# Patient Record
Sex: FEMALE | Race: WHITE | NOT HISPANIC OR LATINO | Employment: UNEMPLOYED | URBAN - METROPOLITAN AREA
[De-identification: names, ages, dates, MRNs, and addresses within clinical notes are randomized per-mention and may not be internally consistent; named-entity substitution may affect disease eponyms.]

---

## 2024-03-22 ENCOUNTER — ANESTHESIA (OUTPATIENT)
Dept: LABOR AND DELIVERY | Facility: HOSPITAL | Age: 24
End: 2024-03-22
Payer: COMMERCIAL

## 2024-03-22 ENCOUNTER — HOSPITAL ENCOUNTER (INPATIENT)
Facility: HOSPITAL | Age: 24
LOS: 3 days | Discharge: HOME | End: 2024-03-25
Attending: OBSTETRICS & GYNECOLOGY | Admitting: OBSTETRICS & GYNECOLOGY
Payer: COMMERCIAL

## 2024-03-22 ENCOUNTER — ANESTHESIA EVENT (OUTPATIENT)
Dept: LABOR AND DELIVERY | Facility: HOSPITAL | Age: 24
End: 2024-03-22
Payer: COMMERCIAL

## 2024-03-22 ENCOUNTER — ROUTINE PRENATAL (OUTPATIENT)
Dept: OBSTETRICS AND GYNECOLOGY | Facility: CLINIC | Age: 24
End: 2024-03-22
Payer: COMMERCIAL

## 2024-03-22 VITALS — WEIGHT: 164 LBS | SYSTOLIC BLOOD PRESSURE: 150 MMHG | DIASTOLIC BLOOD PRESSURE: 100 MMHG | BODY MASS INDEX: 33.12 KG/M2

## 2024-03-22 DIAGNOSIS — O99.323 SUBOXONE MAINTENANCE TREATMENT COMPLICATING PREGNANCY, ANTEPARTUM, THIRD TRIMESTER: ICD-10-CM

## 2024-03-22 DIAGNOSIS — Z34.03 PRIMIGRAVIDA, THIRD TRIMESTER: Primary | ICD-10-CM

## 2024-03-22 DIAGNOSIS — Z3A.38 38 WEEKS GESTATION OF PREGNANCY: ICD-10-CM

## 2024-03-22 DIAGNOSIS — F11.11 HISTORY OF OPIOID ABUSE: ICD-10-CM

## 2024-03-22 DIAGNOSIS — F11.20 SUBOXONE MAINTENANCE TREATMENT COMPLICATING PREGNANCY, ANTEPARTUM, THIRD TRIMESTER: ICD-10-CM

## 2024-03-22 PROBLEM — Z34.90 PREGNANCY: Status: ACTIVE | Noted: 2024-03-22

## 2024-03-22 PROBLEM — O13.3 GESTATIONAL HYPERTENSION, THIRD TRIMESTER: Status: ACTIVE | Noted: 2024-03-22

## 2024-03-22 LAB
ABO GROUP BLD: NORMAL
ALBUMIN SERPL-MCNC: 3.1 G/DL (ref 3.5–5.2)
ALBUMIN/GLOB SERPL: 0.9 G/DL
ALP SERPL-CCNC: 156 U/L (ref 39–117)
ALT SERPL W P-5'-P-CCNC: 11 U/L (ref 1–33)
ANION GAP SERPL CALCULATED.3IONS-SCNC: 12.2 MMOL/L (ref 5–15)
AST SERPL-CCNC: 17 U/L (ref 1–32)
BASOPHILS # BLD AUTO: 0.06 10*3/MM3 (ref 0–0.2)
BASOPHILS NFR BLD AUTO: 0.5 % (ref 0–1.5)
BILIRUB SERPL-MCNC: 0.8 MG/DL (ref 0–1.2)
BLD GP AB SCN SERPL QL: NEGATIVE
BUN SERPL-MCNC: 9 MG/DL (ref 6–20)
BUN/CREAT SERPL: 13 (ref 7–25)
CALCIUM SPEC-SCNC: 9 MG/DL (ref 8.6–10.5)
CHLORIDE SERPL-SCNC: 106 MMOL/L (ref 98–107)
CO2 SERPL-SCNC: 19.8 MMOL/L (ref 22–29)
CREAT SERPL-MCNC: 0.69 MG/DL (ref 0.57–1)
DEPRECATED RDW RBC AUTO: 43.6 FL (ref 37–54)
EGFRCR SERPLBLD CKD-EPI 2021: 123.7 ML/MIN/1.73
EOSINOPHIL # BLD AUTO: 0.6 10*3/MM3 (ref 0–0.4)
EOSINOPHIL NFR BLD AUTO: 4.8 % (ref 0.3–6.2)
ERYTHROCYTE [DISTWIDTH] IN BLOOD BY AUTOMATED COUNT: 12.7 % (ref 12.3–15.4)
EXPIRATION DATE: NORMAL
GLOBULIN UR ELPH-MCNC: 3.3 GM/DL
GLUCOSE SERPL-MCNC: 122 MG/DL (ref 65–99)
GLUCOSE UR STRIP-MCNC: NEGATIVE MG/DL
HCT VFR BLD AUTO: 34.2 % (ref 34–46.6)
HGB BLD-MCNC: 11.7 G/DL (ref 12–15.9)
IMM GRANULOCYTES # BLD AUTO: 0.1 10*3/MM3 (ref 0–0.05)
IMM GRANULOCYTES NFR BLD AUTO: 0.8 % (ref 0–0.5)
LYMPHOCYTES # BLD AUTO: 1.98 10*3/MM3 (ref 0.7–3.1)
LYMPHOCYTES NFR BLD AUTO: 15.8 % (ref 19.6–45.3)
Lab: NORMAL
MCH RBC QN AUTO: 32.4 PG (ref 26.6–33)
MCHC RBC AUTO-ENTMCNC: 34.2 G/DL (ref 31.5–35.7)
MCV RBC AUTO: 94.7 FL (ref 79–97)
MONOCYTES # BLD AUTO: 0.85 10*3/MM3 (ref 0.1–0.9)
MONOCYTES NFR BLD AUTO: 6.8 % (ref 5–12)
NEUTROPHILS NFR BLD AUTO: 71.3 % (ref 42.7–76)
NEUTROPHILS NFR BLD AUTO: 8.96 10*3/MM3 (ref 1.7–7)
NRBC BLD AUTO-RTO: 0 /100 WBC (ref 0–0.2)
PLATELET # BLD AUTO: 183 10*3/MM3 (ref 140–450)
PMV BLD AUTO: 12.3 FL (ref 6–12)
POTASSIUM SERPL-SCNC: 3.6 MMOL/L (ref 3.5–5.2)
PROT SERPL-MCNC: 6.4 G/DL (ref 6–8.5)
PROT UR STRIP-MCNC: NEGATIVE MG/DL
RBC # BLD AUTO: 3.61 10*6/MM3 (ref 3.77–5.28)
RH BLD: POSITIVE
SODIUM SERPL-SCNC: 138 MMOL/L (ref 136–145)
T PALLIDUM IGG SER QL: NORMAL
T&S EXPIRATION DATE: NORMAL
WBC NRBC COR # BLD AUTO: 12.55 10*3/MM3 (ref 3.4–10.8)

## 2024-03-22 PROCEDURE — 86850 RBC ANTIBODY SCREEN: CPT | Performed by: OBSTETRICS & GYNECOLOGY

## 2024-03-22 PROCEDURE — 80307 DRUG TEST PRSMV CHEM ANLYZR: CPT | Performed by: OBSTETRICS & GYNECOLOGY

## 2024-03-22 PROCEDURE — 86900 BLOOD TYPING SEROLOGIC ABO: CPT | Performed by: OBSTETRICS & GYNECOLOGY

## 2024-03-22 PROCEDURE — 80053 COMPREHEN METABOLIC PANEL: CPT | Performed by: OBSTETRICS & GYNECOLOGY

## 2024-03-22 PROCEDURE — 84156 ASSAY OF PROTEIN URINE: CPT | Performed by: OBSTETRICS & GYNECOLOGY

## 2024-03-22 PROCEDURE — 25010000002 MORPHINE PER 10 MG: Performed by: OBSTETRICS & GYNECOLOGY

## 2024-03-22 PROCEDURE — 86901 BLOOD TYPING SEROLOGIC RH(D): CPT | Performed by: OBSTETRICS & GYNECOLOGY

## 2024-03-22 PROCEDURE — 3E0P7VZ INTRODUCTION OF HORMONE INTO FEMALE REPRODUCTIVE, VIA NATURAL OR ARTIFICIAL OPENING: ICD-10-PCS | Performed by: OBSTETRICS & GYNECOLOGY

## 2024-03-22 PROCEDURE — 82570 ASSAY OF URINE CREATININE: CPT | Performed by: OBSTETRICS & GYNECOLOGY

## 2024-03-22 PROCEDURE — 85025 COMPLETE CBC W/AUTO DIFF WBC: CPT | Performed by: OBSTETRICS & GYNECOLOGY

## 2024-03-22 PROCEDURE — 25810000003 LACTATED RINGERS PER 1000 ML: Performed by: OBSTETRICS & GYNECOLOGY

## 2024-03-22 PROCEDURE — 86780 TREPONEMA PALLIDUM: CPT | Performed by: OBSTETRICS & GYNECOLOGY

## 2024-03-22 PROCEDURE — 3E033VJ INTRODUCTION OF OTHER HORMONE INTO PERIPHERAL VEIN, PERCUTANEOUS APPROACH: ICD-10-PCS | Performed by: OBSTETRICS & GYNECOLOGY

## 2024-03-22 RX ORDER — DIPHENHYDRAMINE HYDROCHLORIDE 50 MG/ML
12.5 INJECTION INTRAMUSCULAR; INTRAVENOUS EVERY 8 HOURS PRN
Status: DISCONTINUED | OUTPATIENT
Start: 2024-03-22 | End: 2024-03-23 | Stop reason: HOSPADM

## 2024-03-22 RX ORDER — PRENATAL VIT NO.126/IRON/FOLIC 28MG-0.8MG
TABLET ORAL DAILY
COMMUNITY

## 2024-03-22 RX ORDER — EPHEDRINE SULFATE 50 MG/ML
5 INJECTION, SOLUTION INTRAVENOUS
Status: DISCONTINUED | OUTPATIENT
Start: 2024-03-22 | End: 2024-03-23 | Stop reason: HOSPADM

## 2024-03-22 RX ORDER — TERBUTALINE SULFATE 1 MG/ML
0.25 INJECTION, SOLUTION SUBCUTANEOUS AS NEEDED
Status: DISCONTINUED | OUTPATIENT
Start: 2024-03-22 | End: 2024-03-23 | Stop reason: HOSPADM

## 2024-03-22 RX ORDER — ONDANSETRON 4 MG/1
4 TABLET, ORALLY DISINTEGRATING ORAL EVERY 6 HOURS PRN
Status: DISCONTINUED | OUTPATIENT
Start: 2024-03-22 | End: 2024-03-23 | Stop reason: HOSPADM

## 2024-03-22 RX ORDER — DIPHENHYDRAMINE HCL 25 MG
25 CAPSULE ORAL EVERY 6 HOURS PRN
COMMUNITY
End: 2024-03-25 | Stop reason: HOSPADM

## 2024-03-22 RX ORDER — SODIUM CHLORIDE, SODIUM LACTATE, POTASSIUM CHLORIDE, CALCIUM CHLORIDE 600; 310; 30; 20 MG/100ML; MG/100ML; MG/100ML; MG/100ML
125 INJECTION, SOLUTION INTRAVENOUS CONTINUOUS
Status: DISCONTINUED | OUTPATIENT
Start: 2024-03-22 | End: 2024-03-23

## 2024-03-22 RX ORDER — MORPHINE SULFATE 2 MG/ML
1 INJECTION, SOLUTION INTRAMUSCULAR; INTRAVENOUS
Status: DISCONTINUED | OUTPATIENT
Start: 2024-03-22 | End: 2024-03-23 | Stop reason: HOSPADM

## 2024-03-22 RX ORDER — LIDOCAINE HYDROCHLORIDE 10 MG/ML
0.5 INJECTION, SOLUTION INFILTRATION; PERINEURAL ONCE AS NEEDED
Status: DISCONTINUED | OUTPATIENT
Start: 2024-03-22 | End: 2024-03-22

## 2024-03-22 RX ORDER — MAGNESIUM CARB/ALUMINUM HYDROX 105-160MG
30 TABLET,CHEWABLE ORAL ONCE AS NEEDED
Status: DISCONTINUED | OUTPATIENT
Start: 2024-03-22 | End: 2024-03-23 | Stop reason: HOSPADM

## 2024-03-22 RX ORDER — FAMOTIDINE 20 MG/1
20 TABLET, FILM COATED ORAL 2 TIMES DAILY PRN
Status: DISCONTINUED | OUTPATIENT
Start: 2024-03-22 | End: 2024-03-23 | Stop reason: HOSPADM

## 2024-03-22 RX ORDER — ONDANSETRON 2 MG/ML
4 INJECTION INTRAMUSCULAR; INTRAVENOUS ONCE AS NEEDED
Status: COMPLETED | OUTPATIENT
Start: 2024-03-22 | End: 2024-03-23

## 2024-03-22 RX ORDER — FENTANYL/ROPIVACAINE/NS/PF 2MCG/ML-.2
10 PLASTIC BAG, INJECTION (ML) INJECTION CONTINUOUS
Status: DISCONTINUED | OUTPATIENT
Start: 2024-03-22 | End: 2024-03-23

## 2024-03-22 RX ORDER — SODIUM CHLORIDE 0.9 % (FLUSH) 0.9 %
10 SYRINGE (ML) INJECTION AS NEEDED
Status: DISCONTINUED | OUTPATIENT
Start: 2024-03-22 | End: 2024-03-22

## 2024-03-22 RX ORDER — FAMOTIDINE 10 MG/ML
20 INJECTION, SOLUTION INTRAVENOUS 2 TIMES DAILY PRN
Status: DISCONTINUED | OUTPATIENT
Start: 2024-03-22 | End: 2024-03-23 | Stop reason: HOSPADM

## 2024-03-22 RX ORDER — FAMOTIDINE 10 MG/ML
20 INJECTION, SOLUTION INTRAVENOUS ONCE AS NEEDED
Status: DISCONTINUED | OUTPATIENT
Start: 2024-03-22 | End: 2024-03-23 | Stop reason: HOSPADM

## 2024-03-22 RX ORDER — ACETAMINOPHEN 325 MG/1
650 TABLET ORAL EVERY 4 HOURS PRN
Status: DISCONTINUED | OUTPATIENT
Start: 2024-03-22 | End: 2024-03-23 | Stop reason: HOSPADM

## 2024-03-22 RX ORDER — MISOPROSTOL 100 MCG
25 TABLET ORAL EVERY 4 HOURS
Status: DISCONTINUED | OUTPATIENT
Start: 2024-03-22 | End: 2024-03-23

## 2024-03-22 RX ORDER — ONDANSETRON 2 MG/ML
4 INJECTION INTRAMUSCULAR; INTRAVENOUS EVERY 6 HOURS PRN
Status: DISCONTINUED | OUTPATIENT
Start: 2024-03-22 | End: 2024-03-23 | Stop reason: HOSPADM

## 2024-03-22 RX ORDER — SODIUM CHLORIDE 9 MG/ML
40 INJECTION, SOLUTION INTRAVENOUS AS NEEDED
Status: DISCONTINUED | OUTPATIENT
Start: 2024-03-22 | End: 2024-03-22

## 2024-03-22 RX ORDER — SODIUM CHLORIDE 0.9 % (FLUSH) 0.9 %
10 SYRINGE (ML) INJECTION EVERY 12 HOURS SCHEDULED
Status: DISCONTINUED | OUTPATIENT
Start: 2024-03-22 | End: 2024-03-22

## 2024-03-22 RX ADMIN — Medication 25 MCG: at 21:40

## 2024-03-22 RX ADMIN — SODIUM CHLORIDE, POTASSIUM CHLORIDE, SODIUM LACTATE AND CALCIUM CHLORIDE 125 ML/HR: 600; 310; 30; 20 INJECTION, SOLUTION INTRAVENOUS at 16:38

## 2024-03-22 RX ADMIN — MORPHINE SULFATE 1 MG: 2 INJECTION, SOLUTION INTRAMUSCULAR; INTRAVENOUS at 22:06

## 2024-03-22 RX ADMIN — Medication 25 MCG: at 17:36

## 2024-03-22 NOTE — H&P
[facility]  Obstetric History and Physical    Chief Complaint   Patient presents with    Scheduled Induction     Elevated B/P           Patient is a 23 y.o. female  currently at 38w6d, who presents from office for induction due to HDP at term.    Her prenatal care was with Dr. Jin and was complicated by 1) Suboxone treatment in pregnancy, 2) Hypertensive disorder of pregnancy, suspected gestational hypertension .        External Prenatal Results       Pregnancy Outside Results - Transcribed From Office Records - See Scanned Records For Details       Test Value Date Time    ABO  A  10/09/23 1231    Rh  Positive  10/09/23 1231    Antibody Screen  Negative  10/09/23 1231    Varicella IgG  <135 index 10/09/23 1231    Rubella  1.10 index 10/09/23 1231    Hgb  12.9 g/dL 10/09/23 1231    Hct  39.3 % 10/09/23 1231    Glucose Fasting GTT       Glucose Tolerance Test 1 hour       Glucose Tolerance Test 3 hour       Gonorrhea (discrete)  Negative  10/09/23 1357    Chlamydia (discrete)  Negative  10/09/23 1357    RPR  Non Reactive  10/09/23 1231    VDRL       Syphilis Antibody       HBsAg  Negative  10/09/23 1231    Herpes Simplex Virus PCR       Herpes Simplex VIrus Culture       HIV  Non Reactive  10/09/23 1231    Hep C RNA Quant PCR       Hep C Antibody  Non Reactive  10/09/23 1231    AFP       Group B Strep  Negative  24 1526    GBS Susceptibility to Clindamycin       GBS Susceptibility to Erythromycin       Fetal Fibronectin       Genetic Testing, Maternal Blood                 Drug Screening       Test Value Date Time    Urine Drug Screen       Amphetamine Screen  Negative ng/mL 01/10/24 1424       Negative ng/mL 10/09/23 1340    Barbiturate Screen  Negative ng/mL 01/10/24 1424       Negative ng/mL 10/09/23 1340    Benzodiazepine Screen  Negative ng/mL 01/10/24 1424       Negative  10/09/23 1340    Methadone Screen  Negative ng/mL 01/10/24 1424       Negative ng/mL 10/09/23 1340    Phencyclidine Screen   Negative ng/mL 01/10/24 1424       Negative ng/mL 10/09/23 1340    Opiates Screen  Negative  10/27/21 1352    THC Screen  Positive  10/27/21 1352    Cocaine Screen       Propoxyphene Screen  Negative ng/mL 01/10/24 1424       Negative ng/mL 10/09/23 1340    Buprenorphine Screen       Methamphetamine Screen       Oxycodone Screen  Negative  10/27/21 1352    Tricyclic Antidepressants Screen                 Legend    ^: Historical                               OB History    Para Term  AB Living   1 0 0 0 0 0   SAB IAB Ectopic Molar Multiple Live Births   0 0 0 0 0 0      # Outcome Date GA Lbr Dmitri/2nd Weight Sex Type Anes PTL Lv   1 Current                    Past Medical History:   Diagnosis Date    Anxiety     Asthma     Depression     Substance abuse     Pain Meds        History reviewed. No pertinent surgical history.       No current facility-administered medications on file prior to encounter.     Current Outpatient Medications on File Prior to Encounter   Medication Sig Dispense Refill    acetaminophen (Tylenol) 325 MG tablet Tylenol 325 mg oral tablet take 2 tablets (650 mg) by oral route every 4 hours as needed   Suspended      albuterol (PROVENTIL) (2.5 MG/3ML) 0.083% nebulizer solution albuterol sulfate 2.5 mg /3 mL (0.083 %) inhalation solution for nebulization inhale 3 milliliters (2.5 mg) by nebulization route every 4 hours   Suspended      albuterol sulfate  (90 Base) MCG/ACT inhaler albuterol sulfate 90 mcg/actuation inhalation HFA aerosol inhaler inhale 2 puffs (180 mcg) by inhalation route every 4-6 hours as needed for 30 days 3/15/2021  Active      buprenorphine-naloxone (SUBOXONE) 8-2 MG per SL tablet PLACE 2 TABLETS UNDER THE TONGUE EVERY DAY      diphenhydrAMINE (BENADRYL) 25 mg capsule Take 1 capsule by mouth Every 6 (Six) Hours As Needed for Itching.      prenatal vitamin (prenatal, CLASSIC, vitamin) tablet Take  by mouth Daily.      ipratropium-albuterol (DUO-NEB) 0.5-2.5  "mg/3 ml nebulizer ipratropium-albuterol 0.5 mg-3 mg(2.5 mg base)/3 mL inhalation solution for nebulization inhale 3 milliliters by nebulization route 1 time per day for 30 days 5/24/2021  Active      naloxone (NARCAN) 4 MG/0.1ML nasal spray CALL 911. SPR CONTENTS OF ONE SPRAYER (0.1ML) INTO ONE NOSTRIL. REPEAT IN 2-3 MIN IF SYMPTOMS OF OPIOID EMERGENCY PERSIST, ALTERNATE NOSTRILS      ondansetron ODT (ZOFRAN-ODT) 4 MG disintegrating tablet DISSOLVE 1 TABLET ON THE TONGUE EVERY 4 HOURS FOR 3 DAYS AS NEEDED FOR NAUSEA OR VOMITING (Patient not taking: Reported on 1/10/2024)            Allergies   Allergen Reactions    Penicillins Other (See Comments)     AS A KID          Social History     Socioeconomic History    Marital status: Single   Tobacco Use    Smoking status: Former     Types: Cigarettes     Passive exposure: Past    Smokeless tobacco: Never   Vaping Use    Vaping status: Never Used   Substance and Sexual Activity    Alcohol use: Not Currently    Drug use: Not Currently     Types: Marijuana, Hydrocodone, Oxycodone    Sexual activity: Yes     Birth control/protection: None          Family History   Problem Relation Age of Onset    Heart failure Mother     Seizures Mother     No Known Problems Father     No Known Problems Sister     No Known Problems Brother     No Known Problems Brother     No Known Problems Brother     Breast cancer Maternal Aunt         Review of Systems   Constitutional:  Negative for fever.   Eyes:  Negative for visual disturbance.   Gastrointestinal:  Negative for abdominal pain.   Genitourinary:  Negative for dysuria, pelvic pain, vaginal bleeding and vaginal discharge.   Neurological:  Negative for headache.   All other systems reviewed and are negative.      /98   Pulse 109   Temp 97.6 °F (36.4 °C) (Oral)   Resp 16   Ht 152.4 cm (60\")   Wt 74.4 kg (164 lb)   LMP 07/25/2023 (Approximate)   SpO2 100%   BMI 32.03 kg/m²     Physical Exam  Vitals reviewed. Exam conducted with " a chaperone present.   Constitutional:       General: She is not in acute distress.     Appearance: She is well-developed and normal weight.   HENT:      Head: Normocephalic and atraumatic.   Eyes:      General:         Right eye: No discharge.         Left eye: No discharge.      Conjunctiva/sclera: Conjunctivae normal.   Neck:      Thyroid: No thyromegaly.   Cardiovascular:      Rate and Rhythm: Normal rate and regular rhythm.      Heart sounds: Normal heart sounds. No murmur heard.  Pulmonary:      Effort: Pulmonary effort is normal. No respiratory distress.      Breath sounds: Normal breath sounds.   Abdominal:      General: There is distension (EFW 6.5#).      Palpations: Abdomen is soft.      Tenderness: There is no abdominal tenderness.   Genitourinary:     Labia:         Right: No lesion.         Left: No lesion.       Cervix: No cervical bleeding. Lesions: 1-2/60/-2.     Uterus: Enlarged (Gravid).    Musculoskeletal:         General: No tenderness. Normal range of motion.      Cervical back: Normal range of motion and neck supple.      Right lower leg: Edema (1+ edema) present.      Left lower leg: Edema present.   Lymphadenopathy:      Cervical: No cervical adenopathy.   Skin:     General: Skin is warm and dry.      Findings: No rash.   Neurological:      General: No focal deficit present.      Mental Status: She is alert and oriented to person, place, and time.      Deep Tendon Reflexes: Reflexes abnormal (3+ dtrs, no clonus).   Psychiatric:         Behavior: Behavior normal.         Thought Content: Thought content normal.         Judgment: Judgment normal.           FHT - Reassuring, category I   [place]allow[place] - no labor     Lab Results   Component Value Date    WBC 12.55 (H) 03/22/2024    HGB 11.7 (L) 03/22/2024    HCT 34.2 03/22/2024    MCV 94.7 03/22/2024     03/22/2024         Gestational hypertension, third trimester    Suboxone maintenance treatment complicating pregnancy, antepartum, third  trimester      Assessment:  1.  Intrauterine pregnancy at 38w6d weeks gestation with reassuring fetal status.    2.  induction of labor  for HDP  with unfavorable cervix  3.  Obstetrical history significant for  suboxone use, HDP .  4.  GBS status: .negative     Plan:  1. Vaginal anticipated, fetal and uterine monitoring  continuously, cervical ripening with  Misoprostol, labor augmentation  Pitocin, and analgesia with  epidural  Evaluation by me, discussed options, but I think she would benefit from some cervical ripening, so started misoprostol 25 mcg x 2-3 (pending response to treatment)   2. Plan of care has been reviewed with patient and family   3.  Risks, benefits of treatment plan have been discussed.  4.  All questions have been answered.        Rafael Oliveros MD  3/22/2024  17:20 EDT

## 2024-03-22 NOTE — LETTER
[facility]  [phone]    ACCESS CENTER STATEMENT OF DISPOSITION    I, Rajwinder Bakerie LEW Mcgowaney, was assessed in the [facility] at [facility] on 3/25/2024.  I understand the recommendations below and what follow-up action is expected of me.    [facility]  [address], [address] [place] [address],   [phone]  [email or web address]    [facility]  [address], [address], [place], [place] [address] [phone]    [facility]  [address]  [address]  [place], [place] [address] [phone]    [facility]  [phone]    [facility]  [address], [address], [place], [place] [address] [phone]      ________________________________  Patient/Parent/Guardian/POA Signature    ________________________________  Clinician Signature    3/25/2024  09:15 EDT

## 2024-03-22 NOTE — PROGRESS NOTES
Patient with swollen ankles and rash all over her face, back and breast, she uses Hydrocortisone cream and neosporin with no help. GBS negative. Patient missed her appointment last week due over sleeping. Rpt. /100.

## 2024-03-22 NOTE — PROGRESS NOTES
Cc:  Pregnancy follow up.  Patient missed last visit due to work issues.  She feels pressure in lower abdomen and back.  Fetal movement is excellent.  No bleeding.  No headache or visual symptoms.    Repeat BP elevated 150/100. Trace protein.  Gen - alert and pleasant.  Abdomen - nontender  Cervix - 80/1/0  A/P:  IUP at 38+ weeks with hypertensive disorder of pregnancy, likely gestational hypertension.  - Patient to proceed to labor and delivery for induction.  Continuing with pregnancy imposes risks on mother and fetus related to worsening of blood pressure.

## 2024-03-23 LAB
AMPHET+METHAMPHET UR QL: NEGATIVE
BARBITURATES UR QL SCN: NEGATIVE
BENZODIAZ UR QL SCN: NEGATIVE
CANNABINOIDS SERPL QL: NEGATIVE
COCAINE UR QL: NEGATIVE
CREAT UR-MCNC: 166.7 MG/DL
FENTANYL UR-MCNC: NEGATIVE NG/ML
METHADONE UR QL SCN: NEGATIVE
OPIATES UR QL: NEGATIVE
OXYCODONE UR QL SCN: NEGATIVE
PROT ?TM UR-MCNC: 30.4 MG/DL
PROT/CREAT UR: 182.4 MG/G CREA (ref 0–200)

## 2024-03-23 PROCEDURE — 88307 TISSUE EXAM BY PATHOLOGIST: CPT

## 2024-03-23 PROCEDURE — 59410 OBSTETRICAL CARE: CPT | Performed by: OBSTETRICS & GYNECOLOGY

## 2024-03-23 PROCEDURE — C1755 CATHETER, INTRASPINAL: HCPCS | Performed by: ANESTHESIOLOGY

## 2024-03-23 PROCEDURE — 25010000002 DIPHENHYDRAMINE PER 50 MG: Performed by: ANESTHESIOLOGY

## 2024-03-23 PROCEDURE — 0KQM0ZZ REPAIR PERINEUM MUSCLE, OPEN APPROACH: ICD-10-PCS | Performed by: OBSTETRICS & GYNECOLOGY

## 2024-03-23 PROCEDURE — 25010000002 TERBUTALINE PER 1 MG: Performed by: OBSTETRICS & GYNECOLOGY

## 2024-03-23 PROCEDURE — 25810000003 LACTATED RINGERS PER 1000 ML: Performed by: OBSTETRICS & GYNECOLOGY

## 2024-03-23 PROCEDURE — 25010000002 ONDANSETRON PER 1 MG: Performed by: ANESTHESIOLOGY

## 2024-03-23 PROCEDURE — C1755 CATHETER, INTRASPINAL: HCPCS

## 2024-03-23 RX ORDER — BUPRENORPHINE AND NALOXONE 8; 2 MG/1; MG/1
1 FILM, SOLUBLE BUCCAL; SUBLINGUAL DAILY
Status: DISCONTINUED | OUTPATIENT
Start: 2024-03-24 | End: 2024-03-23

## 2024-03-23 RX ORDER — HYDROCORTISONE 25 MG/G
1 CREAM TOPICAL AS NEEDED
Status: DISCONTINUED | OUTPATIENT
Start: 2024-03-23 | End: 2024-03-25 | Stop reason: HOSPADM

## 2024-03-23 RX ORDER — BUPRENORPHINE HYDROCHLORIDE AND NALOXONE HYDROCHLORIDE DIHYDRATE 8; 2 MG/1; MG/1
1 TABLET SUBLINGUAL DAILY
Status: DISCONTINUED | OUTPATIENT
Start: 2024-03-23 | End: 2024-03-23

## 2024-03-23 RX ORDER — OXYTOCIN/0.9 % SODIUM CHLORIDE 30/500 ML
999 PLASTIC BAG, INJECTION (ML) INTRAVENOUS ONCE
Status: COMPLETED | OUTPATIENT
Start: 2024-03-23 | End: 2024-03-23

## 2024-03-23 RX ORDER — CARBOPROST TROMETHAMINE 250 UG/ML
250 INJECTION, SOLUTION INTRAMUSCULAR
Status: DISCONTINUED | OUTPATIENT
Start: 2024-03-23 | End: 2024-03-23 | Stop reason: HOSPADM

## 2024-03-23 RX ORDER — MISOPROSTOL 200 UG/1
800 TABLET ORAL ONCE AS NEEDED
Status: DISCONTINUED | OUTPATIENT
Start: 2024-03-23 | End: 2024-03-23 | Stop reason: HOSPADM

## 2024-03-23 RX ORDER — IBUPROFEN 600 MG/1
600 TABLET ORAL EVERY 6 HOURS PRN
Status: DISCONTINUED | OUTPATIENT
Start: 2024-03-23 | End: 2024-03-25 | Stop reason: HOSPADM

## 2024-03-23 RX ORDER — ERYTHROMYCIN 5 MG/G
OINTMENT OPHTHALMIC
Status: ACTIVE
Start: 2024-03-23 | End: 2024-03-24

## 2024-03-23 RX ORDER — HYDROCODONE BITARTRATE AND ACETAMINOPHEN 5; 325 MG/1; MG/1
1 TABLET ORAL EVERY 4 HOURS PRN
Status: DISCONTINUED | OUTPATIENT
Start: 2024-03-23 | End: 2024-03-25 | Stop reason: HOSPADM

## 2024-03-23 RX ORDER — ACETAMINOPHEN 325 MG/1
650 TABLET ORAL EVERY 6 HOURS PRN
Status: DISCONTINUED | OUTPATIENT
Start: 2024-03-23 | End: 2024-03-25 | Stop reason: HOSPADM

## 2024-03-23 RX ORDER — OXYTOCIN/0.9 % SODIUM CHLORIDE 30/500 ML
250 PLASTIC BAG, INJECTION (ML) INTRAVENOUS CONTINUOUS
Status: DISPENSED | OUTPATIENT
Start: 2024-03-23 | End: 2024-03-23

## 2024-03-23 RX ORDER — HYDROCODONE BITARTRATE AND ACETAMINOPHEN 10; 325 MG/1; MG/1
1 TABLET ORAL EVERY 4 HOURS PRN
Status: DISCONTINUED | OUTPATIENT
Start: 2024-03-23 | End: 2024-03-25 | Stop reason: HOSPADM

## 2024-03-23 RX ORDER — OXYTOCIN/0.9 % SODIUM CHLORIDE 30/500 ML
2-20 PLASTIC BAG, INJECTION (ML) INTRAVENOUS
Status: DISCONTINUED | OUTPATIENT
Start: 2024-03-23 | End: 2024-03-23

## 2024-03-23 RX ORDER — BISACODYL 10 MG
10 SUPPOSITORY, RECTAL RECTAL DAILY PRN
Status: DISCONTINUED | OUTPATIENT
Start: 2024-03-24 | End: 2024-03-25 | Stop reason: HOSPADM

## 2024-03-23 RX ORDER — OXYTOCIN/0.9 % SODIUM CHLORIDE 30/500 ML
125 PLASTIC BAG, INJECTION (ML) INTRAVENOUS ONCE AS NEEDED
Status: COMPLETED | OUTPATIENT
Start: 2024-03-23 | End: 2024-03-23

## 2024-03-23 RX ORDER — ALBUTEROL SULFATE 90 UG/1
2 AEROSOL, METERED RESPIRATORY (INHALATION) EVERY 6 HOURS PRN
Status: DISCONTINUED | OUTPATIENT
Start: 2024-03-23 | End: 2024-03-25 | Stop reason: HOSPADM

## 2024-03-23 RX ORDER — DIPHENHYDRAMINE HCL 25 MG
25 CAPSULE ORAL NIGHTLY PRN
Status: DISCONTINUED | OUTPATIENT
Start: 2024-03-23 | End: 2024-03-25 | Stop reason: HOSPADM

## 2024-03-23 RX ORDER — PRENATAL VIT/IRON FUM/FOLIC AC 27MG-0.8MG
1 TABLET ORAL DAILY
Status: DISCONTINUED | OUTPATIENT
Start: 2024-03-24 | End: 2024-03-25 | Stop reason: HOSPADM

## 2024-03-23 RX ORDER — DOCUSATE SODIUM 100 MG/1
100 CAPSULE, LIQUID FILLED ORAL 2 TIMES DAILY
Status: DISCONTINUED | OUTPATIENT
Start: 2024-03-23 | End: 2024-03-25 | Stop reason: HOSPADM

## 2024-03-23 RX ORDER — PHYTONADIONE 1 MG/.5ML
INJECTION, EMULSION INTRAMUSCULAR; INTRAVENOUS; SUBCUTANEOUS
Status: ACTIVE
Start: 2024-03-23 | End: 2024-03-24

## 2024-03-23 RX ORDER — BUPRENORPHINE AND NALOXONE 8; 2 MG/1; MG/1
1 FILM, SOLUBLE BUCCAL; SUBLINGUAL DAILY
Status: DISCONTINUED | OUTPATIENT
Start: 2024-03-23 | End: 2024-03-23

## 2024-03-23 RX ORDER — METHYLERGONOVINE MALEATE 0.2 MG/ML
200 INJECTION INTRAVENOUS ONCE AS NEEDED
Status: DISCONTINUED | OUTPATIENT
Start: 2024-03-23 | End: 2024-03-23 | Stop reason: HOSPADM

## 2024-03-23 RX ORDER — TRANEXAMIC ACID 10 MG/ML
1000 INJECTION, SOLUTION INTRAVENOUS ONCE AS NEEDED
Status: DISCONTINUED | OUTPATIENT
Start: 2024-03-23 | End: 2024-03-25 | Stop reason: HOSPADM

## 2024-03-23 RX ORDER — KETOROLAC TROMETHAMINE 15 MG/ML
15 INJECTION, SOLUTION INTRAMUSCULAR; INTRAVENOUS EVERY 6 HOURS PRN
Status: ACTIVE | OUTPATIENT
Start: 2024-03-23 | End: 2024-03-24

## 2024-03-23 RX ORDER — LIDOCAINE HCL/EPINEPHRINE/PF 2%-1:200K
VIAL (ML) INJECTION AS NEEDED
Status: DISCONTINUED | OUTPATIENT
Start: 2024-03-23 | End: 2024-03-23 | Stop reason: SURG

## 2024-03-23 RX ORDER — ALBUTEROL SULFATE 2.5 MG/3ML
2.5 SOLUTION RESPIRATORY (INHALATION) EVERY 6 HOURS PRN
Status: DISCONTINUED | OUTPATIENT
Start: 2024-03-23 | End: 2024-03-25 | Stop reason: HOSPADM

## 2024-03-23 RX ORDER — SODIUM CHLORIDE 0.9 % (FLUSH) 0.9 %
1-10 SYRINGE (ML) INJECTION AS NEEDED
Status: DISCONTINUED | OUTPATIENT
Start: 2024-03-23 | End: 2024-03-25 | Stop reason: HOSPADM

## 2024-03-23 RX ADMIN — IBUPROFEN 600 MG: 600 TABLET ORAL at 19:21

## 2024-03-23 RX ADMIN — ONDANSETRON 4 MG: 2 INJECTION INTRAMUSCULAR; INTRAVENOUS at 14:37

## 2024-03-23 RX ADMIN — SODIUM CHLORIDE, POTASSIUM CHLORIDE, SODIUM LACTATE AND CALCIUM CHLORIDE 125 ML/HR: 600; 310; 30; 20 INJECTION, SOLUTION INTRAVENOUS at 09:24

## 2024-03-23 RX ADMIN — DIPHENHYDRAMINE HYDROCHLORIDE 12.5 MG: 50 INJECTION, SOLUTION INTRAMUSCULAR; INTRAVENOUS at 04:38

## 2024-03-23 RX ADMIN — Medication 10 ML/HR: at 12:22

## 2024-03-23 RX ADMIN — Medication 10 ML/HR: at 01:28

## 2024-03-23 RX ADMIN — Medication 125 ML/HR: at 19:26

## 2024-03-23 RX ADMIN — SODIUM CHLORIDE, POTASSIUM CHLORIDE, SODIUM LACTATE AND CALCIUM CHLORIDE 125 ML/HR: 600; 310; 30; 20 INJECTION, SOLUTION INTRAVENOUS at 14:37

## 2024-03-23 RX ADMIN — LIDOCAINE HYDROCHLORIDE AND EPINEPHRINE 3 ML: 20; 5 INJECTION, SOLUTION EPIDURAL; INFILTRATION; INTRACAUDAL; PERINEURAL at 01:24

## 2024-03-23 RX ADMIN — SODIUM CHLORIDE, POTASSIUM CHLORIDE, SODIUM LACTATE AND CALCIUM CHLORIDE 999 ML/HR: 600; 310; 30; 20 INJECTION, SOLUTION INTRAVENOUS at 01:35

## 2024-03-23 RX ADMIN — Medication 999 ML/HR: at 17:59

## 2024-03-23 RX ADMIN — DOCUSATE SODIUM 100 MG: 100 CAPSULE, LIQUID FILLED ORAL at 22:58

## 2024-03-23 RX ADMIN — BUPRENORPHINE HYDROCHLORIDE AND NALOXONE HYDROCHLORIDE DIHYDRATE 0.25 TABLET: 8; 2 TABLET SUBLINGUAL at 09:56

## 2024-03-23 RX ADMIN — TERBUTALINE SULFATE 0.25 MG: 1 INJECTION, SOLUTION SUBCUTANEOUS at 02:19

## 2024-03-23 RX ADMIN — SODIUM CHLORIDE, POTASSIUM CHLORIDE, SODIUM LACTATE AND CALCIUM CHLORIDE 125 ML/HR: 600; 310; 30; 20 INJECTION, SOLUTION INTRAVENOUS at 04:33

## 2024-03-23 RX ADMIN — Medication 2 MILLI-UNITS/MIN: at 04:35

## 2024-03-23 RX ADMIN — Medication 10 ML/HR: at 07:49

## 2024-03-23 RX ADMIN — BUPRENORPHINE HYDROCHLORIDE AND NALOXONE HYDROCHLORIDE DIHYDRATE 0.25 TABLET: 8; 2 TABLET SUBLINGUAL at 16:13

## 2024-03-23 NOTE — PLAN OF CARE
Goal Outcome Evaluation:  Plan of Care Reviewed With: patient        Progress: improving  Outcome Evaluation: pt is IOL for HTN. pt recieved 2 doses of cytotec, was given terbutaline and eventually started on pitocin. pt currently on 4 of pitocin and resting comfortably with epidural. anticipate vaginal delivery.

## 2024-03-23 NOTE — ANESTHESIA PROCEDURE NOTES
Labor Epidural      Patient location during procedure: OB  Performed By  Anesthesiologist: Khalif Jones MD  Preanesthetic Checklist  Completed: patient identified, IV checked, site marked, risks and benefits discussed, surgical consent, monitors and equipment checked, pre-op evaluation and timeout performed  Prep:  Pt Position:sitting  Sterile Tech:cap, gloves and mask  Prep:chlorhexidine gluconate and isopropyl alcohol  Monitoring:blood pressure monitoring, continuous pulse oximetry and EKG  Epidural Block Procedure:  Approach:midline  Guidance:landmark technique and palpation technique  Location:L4-L5  Needle Type:Tuohy  Needle Gauge:18 G  Loss of Resistance Medium: saline  Paresthesia: none  Aspiration:negative  Test Dose:negative  Post Assessment:  Dressing:occlusive dressing applied and secured with tape  Pt Tolerance:patient tolerated the procedure well with no apparent complications  Complications:no

## 2024-03-23 NOTE — ANESTHESIA PREPROCEDURE EVALUATION
Anesthesia Evaluation     Patient summary reviewed and Nursing notes reviewed   NPO Solid Status: > 8 hours  NPO Liquid Status: > 2 hours           Airway   Dental      Pulmonary - negative pulmonary ROS   Cardiovascular   Exercise tolerance: good (4-7 METS)    (+) hypertension      Neuro/Psych- negative ROS  (-) seizures, CVA  GI/Hepatic/Renal/Endo    (+) obesity  (-) diabetes    Musculoskeletal (-) negative ROS    Abdominal    Substance History - negative use  (-) alcohol use, drug use     OB/GYN    (+) Pregnant        Other - negative ROS                   Anesthesia Plan    ASA 3     epidural       Anesthetic plan, risks, benefits, and alternatives have been provided, discussed and informed consent has been obtained with: patient.    CODE STATUS:    Level Of Support Discussed With: Patient  Code Status (Patient has no pulse and is not breathing): CPR (Attempt to Resuscitate)  Medical Interventions (Patient has pulse or is breathing): Full Support

## 2024-03-23 NOTE — PROGRESS NOTES
Fetal heart tones are reassuring.  Blood pressures have been stable.  The patient is comfortable with an epidural.  Cervix is 70% effaced and 2 to 3 cm dilated.  Amniotomy performed with return of clear fluid.  An intrauterine pressure catheter was placed to better monitor contractions.  The patient tolerated the procedure well.

## 2024-03-23 NOTE — L&D DELIVERY NOTE
Delivery Note    Obstetrician:   Koko Ortiz MD      Pre-Delivery Diagnosis: 1.  Intrauterine pregnancy at 30-6/7  2.  Gestational hypertension    Post-Delivery Diagnosis: Same    Procedure: Spontaneous vaginal delivery     Episiotomy or Incision: none    23-year-old  1 para 0 presented at 38-6/7 weeks for an induction of labor due to gestational hypertension.  Repeat strep status was negative.  Fetal heart tones were reactive.  Cervical ripening was undertaken with Cytotec.  Amniotomy was performed at 2 cm with return of clear fluid.  An epidural catheter had been placed for pain control.      The patient progressed along an adequate labor curve to complete effacement and dilation as well as +2 station of the presenting part.  The patient felt intense pressure.  She began to push.  She pushed a spontaneous vaginal delivery of the fetal head from direct occiput anterior presentation.  The mouth and naris were suctioned with a bulb while on the perineum.  Shoulders were delivered without difficulty, followed by the remainder of the infant.      After delay of 30 seconds, the cord was doubly clamped and divided.  The infant was then placed on the mother's chest for Kangaroo care.      The placenta  spontaneously.  It was intact and had a three-vessel cord.  The perineum was inspected.  There was a second-degree midline laceration which was repaired with 3-0 Monocryl in layers.  There was also a very small right labial laceration which was repaired with 3-0 chromic.  The cervix was intact.            Apgars: 1' 8  /  5' 9     Placenta and Cord:          Mechanism: spontaneous        Description:  complete    Estimated Blood Loss:   128cc                             Complications:  None           Condition: Stable    Female       3/23/2024  Koko Ortiz MD

## 2024-03-23 NOTE — PLAN OF CARE
Goal Outcome Evaluation:  Plan of Care Reviewed With: patient        Progress: improving  Outcome Evaluation:  at 1752 of viable baby girl. APGARS 8/9. QBL 128cc. VSS. 2nd degree laceration and right labial that were both repaired. Patient currently on suboxone for a hx of opiod use. Encouraged ibuprophen for pain management during PP period. Mom and baby doing well in recovery.      Problem: Adult Inpatient Plan of Care  Goal: Plan of Care Review  Outcome: Ongoing, Progressing  Flowsheets (Taken 3/23/2024 1937)  Progress: improving  Plan of Care Reviewed With: patient  Outcome Evaluation:  at 1752 of viable baby girl. APGARS 8/9. QBL 128cc. VSS. 2nd degree laceration and right labial that were both repaired. Patient currently on suboxone for a hx of opiod use. Encouraged ibuprophen for pain management during PP period. Mom and baby doing well in recovery.  Goal: Patient-Specific Goal (Individualized)  Outcome: Ongoing, Progressing  Flowsheets (Taken 3/23/2024 1937)  Patient-Specific Goals (Include Timeframe): pain managed by discharge with minimal assistance.  Individualized Care Needs: continue suboxone during PP period. breastfeed/pump.  Anxieties, Fears or Concerns: patient is very anxious about labor process, first time mom, pp period  Goal: Absence of Hospital-Acquired Illness or Injury  Outcome: Ongoing, Progressing  Intervention: Identify and Manage Fall Risk  Recent Flowsheet Documentation  Taken 3/23/2024 0745 by Leslie Kenney RN  Safety Promotion/Fall Prevention:   assistive device/personal items within reach   clutter free environment maintained   safety round/check completed   room organization consistent  Intervention: Prevent and Manage VTE (Venous Thromboembolism) Risk  Recent Flowsheet Documentation  Taken 3/23/2024 0745 by Leslie Kenney RN  Activity Management: bedrest  Goal: Optimal Comfort and Wellbeing  Outcome: Ongoing, Progressing  Goal: Readiness for Transition of  Care  Outcome: Ongoing, Progressing     Problem: Bleeding (Labor)  Goal: Hemostasis  Outcome: Ongoing, Progressing     Problem: Change in Fetal Wellbeing (Labor)  Goal: Stable Fetal Wellbeing  Outcome: Ongoing, Progressing     Problem: Delayed Labor Progression (Labor)  Goal: Effective Progression to Delivery  Outcome: Ongoing, Progressing     Problem: Infection (Labor)  Goal: Absence of Infection Signs and Symptoms  Outcome: Ongoing, Progressing     Problem: Labor Pain (Labor)  Goal: Acceptable Pain Control  Outcome: Ongoing, Progressing     Problem: Uterine Tachysystole (Labor)  Goal: Normal Uterine Contraction Pattern  Outcome: Ongoing, Progressing     Problem: Skin Injury Risk Increased  Goal: Skin Health and Integrity  Outcome: Ongoing, Progressing     Problem: Device-Related Complication Risk (Anesthesia/Analgesia, Neuraxial)  Goal: Safe Infusion Delivery Completion  Outcome: Ongoing, Progressing     Problem: Infection (Anesthesia/Analgesia, Neuraxial)  Goal: Absence of Infection Signs and Symptoms  Outcome: Ongoing, Progressing     Problem: Nausea and Vomiting (Anesthesia/Analgesia, Neuraxial)  Goal: Nausea and Vomiting Relief  Outcome: Ongoing, Progressing     Problem: Pain (Anesthesia/Analgesia, Neuraxial)  Goal: Effective Pain Control  Outcome: Ongoing, Progressing     Problem: Respiratory Compromise (Anesthesia/Analgesia, Neuraxial)  Goal: Effective Oxygenation and Ventilation  Outcome: Ongoing, Progressing     Problem: Sensorimotor Impairment (Anesthesia/Analgesia, Neuraxial)  Goal: Baseline Motor Function  Outcome: Ongoing, Progressing  Intervention: Optimize Sensorimotor Function  Recent Flowsheet Documentation  Taken 3/23/2024 3992 by Leslie Kenney RN  Safety Promotion/Fall Prevention:   assistive device/personal items within reach   clutter free environment maintained   safety round/check completed   room organization consistent     Problem: Urinary Retention (Anesthesia/Analgesia,  Neuraxial)  Goal: Effective Urinary Elimination  Outcome: Ongoing, Progressing     Problem:  Fall Injury Risk  Goal: Absence of Fall, Infant Drop and Related Injury  Outcome: Ongoing, Progressing  Intervention: Identify and Manage Contributors  Recent Flowsheet Documentation  Taken 3/23/2024 0745 by Leslie Kenney, RN  Medication Review/Management: medications reviewed  Intervention: Promote Injury-Free Environment  Recent Flowsheet Documentation  Taken 3/23/2024 0745 by Leslie Kenney RN  Safety Promotion/Fall Prevention:   assistive device/personal items within reach   clutter free environment maintained   safety round/check completed   room organization consistent

## 2024-03-24 LAB
BASOPHILS # BLD AUTO: 0.05 10*3/MM3 (ref 0–0.2)
BASOPHILS NFR BLD AUTO: 0.3 % (ref 0–1.5)
DEPRECATED RDW RBC AUTO: 46 FL (ref 37–54)
EOSINOPHIL # BLD AUTO: 0.49 10*3/MM3 (ref 0–0.4)
EOSINOPHIL NFR BLD AUTO: 2.8 % (ref 0.3–6.2)
ERYTHROCYTE [DISTWIDTH] IN BLOOD BY AUTOMATED COUNT: 13 % (ref 12.3–15.4)
HCT VFR BLD AUTO: 32.6 % (ref 34–46.6)
HGB BLD-MCNC: 10.9 G/DL (ref 12–15.9)
IMM GRANULOCYTES # BLD AUTO: 0.12 10*3/MM3 (ref 0–0.05)
IMM GRANULOCYTES NFR BLD AUTO: 0.7 % (ref 0–0.5)
LYMPHOCYTES # BLD AUTO: 2.75 10*3/MM3 (ref 0.7–3.1)
LYMPHOCYTES NFR BLD AUTO: 15.8 % (ref 19.6–45.3)
MCH RBC QN AUTO: 32.2 PG (ref 26.6–33)
MCHC RBC AUTO-ENTMCNC: 33.4 G/DL (ref 31.5–35.7)
MCV RBC AUTO: 96.4 FL (ref 79–97)
MONOCYTES # BLD AUTO: 1.59 10*3/MM3 (ref 0.1–0.9)
MONOCYTES NFR BLD AUTO: 9.1 % (ref 5–12)
NEUTROPHILS NFR BLD AUTO: 12.42 10*3/MM3 (ref 1.7–7)
NEUTROPHILS NFR BLD AUTO: 71.3 % (ref 42.7–76)
NRBC BLD AUTO-RTO: 0 /100 WBC (ref 0–0.2)
PLATELET # BLD AUTO: 161 10*3/MM3 (ref 140–450)
PMV BLD AUTO: 11.5 FL (ref 6–12)
RBC # BLD AUTO: 3.38 10*6/MM3 (ref 3.77–5.28)
WBC NRBC COR # BLD AUTO: 17.42 10*3/MM3 (ref 3.4–10.8)

## 2024-03-24 PROCEDURE — 85025 COMPLETE CBC W/AUTO DIFF WBC: CPT | Performed by: OBSTETRICS & GYNECOLOGY

## 2024-03-24 PROCEDURE — 0503F POSTPARTUM CARE VISIT: CPT | Performed by: OBSTETRICS & GYNECOLOGY

## 2024-03-24 RX ADMIN — IBUPROFEN 600 MG: 600 TABLET ORAL at 08:48

## 2024-03-24 RX ADMIN — ACETAMINOPHEN 325MG 650 MG: 325 TABLET ORAL at 13:15

## 2024-03-24 RX ADMIN — IBUPROFEN 600 MG: 600 TABLET ORAL at 20:46

## 2024-03-24 RX ADMIN — IBUPROFEN 600 MG: 600 TABLET ORAL at 14:51

## 2024-03-24 RX ADMIN — ACETAMINOPHEN 325MG 650 MG: 325 TABLET ORAL at 19:45

## 2024-03-24 RX ADMIN — DOCUSATE SODIUM 100 MG: 100 CAPSULE, LIQUID FILLED ORAL at 08:48

## 2024-03-24 RX ADMIN — DOCUSATE SODIUM 100 MG: 100 CAPSULE, LIQUID FILLED ORAL at 20:46

## 2024-03-24 RX ADMIN — Medication 1 TABLET: at 08:48

## 2024-03-24 RX ADMIN — IBUPROFEN 600 MG: 600 TABLET ORAL at 02:15

## 2024-03-24 RX ADMIN — Medication: at 00:18

## 2024-03-24 NOTE — PLAN OF CARE
Goal Outcome Evaluation:  Plan of Care Reviewed With: patient        Progress: improving  Outcome Evaluation: VSS.  Fundal assessment and lochia, wnl.  Pt left unit to walk family out before all checks were finished.  RN educated pt about importance of her safety and reiterated to pt about getting assistance while ambulating x2 before being up ad kay.  Pt stated her understanding.

## 2024-03-24 NOTE — LACTATION NOTE
Pt is formula feeding baby.      Lactation Consult Note    Evaluation Completed: 3/24/2024 13:29 EDT  Patient Name: Rajwinder Pascual  :  2000  MRN:  [id number]     REFERRAL  INFORMATION:                                         DELIVERY HISTORY:        Skin to skin initiation date/time: 3/23/2024  5:55 PM   Skin to skin end date/time: 3/23/2024  6:55 PM        MATERNAL ASSESSMENT:                               INFANT ASSESSMENT:  Information for the patient's :  AnkurRajwinder pedroza Antonino [[id number]]   No past medical history on file.                                                                                                   MATERNAL INFANT FEEDING:                                                                       EQUIPMENT TYPE:                                 BREAST PUMPING:          LACTATION REFERRALS:

## 2024-03-24 NOTE — PROGRESS NOTES
Postpartum Progress Note      Status post Vaginal Delivery: Doing well postoperatively.     1) postpartum care immediately following delivery : Doing well, routine care   2) gestational hypertension - only with mild systolic elevation post delivery - all others normal and not on medication. Continue to follow trend. No HELLP or pre-eclampsia with labs.  3) Suboxone use- Infant undergoing PING - discussed boarding with patient if available.     Rh status: A positive   Syphilis screen in hospital: NR  Rubella: immune  Gender: Female     Subjective     Postpartum Day 1: Vaginal delivery    The patient feels well. The patient denies emotional concerns. Pain is well controlled with current medications. The baby is well. The patient is ambulating well. The patient is tolerating a normal diet.     Objective     Vital signs in last 24 hours:  Temp:  [97.2 °F (36.2 °C)-99.3 °F (37.4 °C)] 98 °F (36.7 °C)  Heart Rate:  [] 112  Resp:  [16-18] 18  BP: (112-168)/() 125/81      Physical Exam  Constitutional:       General: She is not in acute distress.     Appearance: Normal appearance. She is normal weight.   Pulmonary:      Effort: Pulmonary effort is normal. No respiratory distress.   Abdominal:      General: There is no distension (Fundus firm).      Tenderness: There is no abdominal tenderness.   Musculoskeletal:         General: No tenderness.      Right lower leg: Edema (1+ edema) present.      Left lower leg: Edema present.   Neurological:      General: No focal deficit present.      Mental Status: She is alert.      Deep Tendon Reflexes: Reflexes normal.   Vitals reviewed.          Lab Results   Component Value Date    WBC 17.42 (H) 03/24/2024    HGB 10.9 (L) 03/24/2024    HCT 32.6 (L) 03/24/2024    MCV 96.4 03/24/2024     03/24/2024       Rafael Oliveros MD  3/24/2024  14:16 EDT

## 2024-03-25 ENCOUNTER — PATIENT OUTREACH (OUTPATIENT)
Dept: LABOR AND DELIVERY | Facility: HOSPITAL | Age: 24
End: 2024-03-25
Payer: COMMERCIAL

## 2024-03-25 VITALS
TEMPERATURE: 97.5 F | RESPIRATION RATE: 16 BRPM | SYSTOLIC BLOOD PRESSURE: 129 MMHG | OXYGEN SATURATION: 98 % | HEART RATE: 108 BPM | HEIGHT: 60 IN | DIASTOLIC BLOOD PRESSURE: 84 MMHG | WEIGHT: 164 LBS | BODY MASS INDEX: 32.2 KG/M2

## 2024-03-25 PROCEDURE — 90791 PSYCH DIAGNOSTIC EVALUATION: CPT

## 2024-03-25 PROCEDURE — 0503F POSTPARTUM CARE VISIT: CPT | Performed by: NURSE PRACTITIONER

## 2024-03-25 RX ORDER — ACETAMINOPHEN 325 MG/1
650 TABLET ORAL EVERY 6 HOURS PRN
Qty: 45 TABLET | Refills: 2 | Status: SHIPPED | OUTPATIENT
Start: 2024-03-25

## 2024-03-25 RX ORDER — ALBUTEROL SULFATE 90 UG/1
2 AEROSOL, METERED RESPIRATORY (INHALATION) EVERY 4 HOURS PRN
Qty: 8 G | Refills: 2 | Status: SHIPPED | OUTPATIENT
Start: 2024-03-25

## 2024-03-25 RX ORDER — IBUPROFEN 600 MG/1
600 TABLET ORAL EVERY 6 HOURS PRN
Qty: 90 TABLET | Refills: 1 | Status: SHIPPED | OUTPATIENT
Start: 2024-03-25

## 2024-03-25 RX ORDER — CYCLOBENZAPRINE HCL 5 MG
5 TABLET ORAL 2 TIMES DAILY PRN
Qty: 10 TABLET | Refills: 0 | Status: SHIPPED | OUTPATIENT
Start: 2024-03-25

## 2024-03-25 RX ORDER — ALBUTEROL SULFATE 90 UG/1
2 AEROSOL, METERED RESPIRATORY (INHALATION) EVERY 4 HOURS PRN
Qty: 18 G | Refills: 2 | Status: SHIPPED | OUTPATIENT
Start: 2024-03-25

## 2024-03-25 RX ORDER — PSEUDOEPHEDRINE HCL 30 MG
100 TABLET ORAL 2 TIMES DAILY
Qty: 30 CAPSULE | Refills: 0 | Status: SHIPPED | OUTPATIENT
Start: 2024-03-25

## 2024-03-25 RX ORDER — CYCLOBENZAPRINE HCL 10 MG
5 TABLET ORAL 2 TIMES DAILY PRN
Status: DISCONTINUED | OUTPATIENT
Start: 2024-03-25 | End: 2024-03-25 | Stop reason: HOSPADM

## 2024-03-25 RX ADMIN — ALBUTEROL SULFATE 2 PUFF: 90 AEROSOL, METERED RESPIRATORY (INHALATION) at 10:44

## 2024-03-25 RX ADMIN — HYDROCODONE BITARTRATE AND ACETAMINOPHEN 1 TABLET: 10; 325 TABLET ORAL at 12:15

## 2024-03-25 RX ADMIN — Medication 1 TABLET: at 08:29

## 2024-03-25 RX ADMIN — CYCLOBENZAPRINE 5 MG: 10 TABLET, FILM COATED ORAL at 10:46

## 2024-03-25 RX ADMIN — IBUPROFEN 600 MG: 600 TABLET ORAL at 06:37

## 2024-03-25 RX ADMIN — ACETAMINOPHEN 325MG 650 MG: 325 TABLET ORAL at 04:18

## 2024-03-25 RX ADMIN — DOCUSATE SODIUM 100 MG: 100 CAPSULE, LIQUID FILLED ORAL at 08:29

## 2024-03-25 RX ADMIN — HYDROCODONE BITARTRATE AND ACETAMINOPHEN 1 TABLET: 10; 325 TABLET ORAL at 08:29

## 2024-03-25 NOTE — CONSULTS
"REASON FOR ACCESS CONSULT:       EPDS of 22    HPI:  Pt was found sitting up in bed with baby girl, Melecio, at bedside in [facility]. Introduced self and explained role - she is agreeable for assessment. Discussed [place] questionnaire with pt - she stated, \"I answered those questions on how I felt during the pregnancy... I feel more like myself since the birth.. my mood has lifted a bit\". She stated she has hx of anxiety and depression and during her pregnancy her mood was lower and \"I didn't even want to shower and stuff\".     She denied SI/HI/AVH now or in recent past. She rated her current depression level 6/10 (10 being the worst), anxiety 8/10. Current stressors include she is worrying about her baby (baby undergoing PING and will be staying in the hospital for a few more days) \"I know she doesn't feel good\", and \"I don't like disappointing people\". She relayed her mom is upset and hurt that she and baby are moving in with pt's father. Pt also worried about currently being unemployed.   She stated her appetite has been \"great\"; however, sleep has been \"awful\" due to new baby schedule and being uncomfortable prior to giving birth.     MENTAL HEALTH HX:  See above. Pt quit Effexor, managed by her PCP, shortly after becoming pregnant. Had been taking for a few years. She has never worked with a therapist or seen a psychiatrist. She denied hx of psych hospitalizations, suicide attempts/self-harm. She denied hx of abuse/trauma. There is a familial hx of anxiety and depression on her maternal side.    SUBSTANCE USE HX:  Hx of opioid abuse. She stated she began abusing pain pills in high school in 2016. In 2017 she enrolled in a Suboxone clinic. Started using again in 2020. Had some intermittent sobriety until about a year ago when she started buying them off the street again.     After she found out she was pregnant she enrolled online in [facility] for Suboxone and counseling. She participates in " "weekly meetings (a group for pregnancy). Per pt, there is a doctor involved with the group. She stated her current craving is 4/10 (10 being the worst) - she stated what keeps her from using is \"I think of the withdrawals and now I have someone else to take care of\". She has been to no other treatment or NA meetings.    She denied other current/recent past drug use. Hx of THC use - quit three years ago. Hx of buying benzos off the street, but nothing recent. She denied legalities related to use. She denied hx of overdose. Familial hx of JIMENA on both sides. She smokes about 3 cigs/day.    SOCIAL HX:  Pt is single with an \"off and on\" relationship with baby's father, Daniel (she stated he does not use). She stated he plans on being involved with parenting. She and baby will be moving in with her after discharge. She stated she felt safe in her environments. She has no other children. 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 Stated support system includes \"my mom, dad, sister-in-law, Daniel\".     PLAN:  Educated pt on PPD, etc., and what to do. Pt stated she is aware and has been \"watching videos about it\". Discussed pt's risk for PPD given her hx - she voiced understanding; however, pt stated she does not want to resume her Effexor just yet as \"I want to see if I can go without it\". Encouraged pt to keep her OB and/or PCP apprised of how she is feeling. Outpatient mental health counseling resources given to her at her request for anxiety management. Pt plans to continue with her online Suboxone clinic - next appt is this Wednesday and she will be attending online. Discussed with primary RN and APRN on unit.  "

## 2024-03-25 NOTE — NURSING NOTE
Patient scored a 22 on postpartum depression screen. Reeducated patient on postpartum depression and discussed resources. Access consult placed.

## 2024-03-25 NOTE — DISCHARGE SUMMARY
Date of Discharge:  3/25/2024    Discharge Diagnosis: s/p vaginal delivery     Presenting Problem/History of Present Illness  Pregnancy [Z34.90]     Hospital Course  Patient is a 23 y.o. female presented for induction of labor.  Her pregnancy was complicated by suboxone use, and GHTN.  She delivered a viable female infant weighing 6lb 6.3oz with apgars of 8&9. For further information surrounding this delivery please seen delivery note. Her postpartum course has been uncopmlicated. She is voiding adequately, tolerating a regular diet, and she is ambulating without difficulty or assistance. Her pain is well controlled. We have reviewed discharge instructions in detail.     Procedures Performed         Consults:   Consults       No orders found from 2/22/2024 to 3/23/2024.            Pertinent Test Results:   WBC   Date Value Ref Range Status   03/24/2024 17.42 (H) 3.40 - 10.80 10*3/mm3 Final     RBC   Date Value Ref Range Status   03/24/2024 3.38 (L) 3.77 - 5.28 10*6/mm3 Final     Hemoglobin   Date Value Ref Range Status   03/24/2024 10.9 (L) 12.0 - 15.9 g/dL Final     Hematocrit   Date Value Ref Range Status   03/24/2024 32.6 (L) 34.0 - 46.6 % Final     MCV   Date Value Ref Range Status   03/24/2024 96.4 79.0 - 97.0 fL Final     MCH   Date Value Ref Range Status   03/24/2024 32.2 26.6 - 33.0 pg Final     MCHC   Date Value Ref Range Status   03/24/2024 33.4 31.5 - 35.7 g/dL Final     RDW   Date Value Ref Range Status   03/24/2024 13.0 12.3 - 15.4 % Final     RDW-SD   Date Value Ref Range Status   03/24/2024 46.0 37.0 - 54.0 fl Final     MPV   Date Value Ref Range Status   03/24/2024 11.5 6.0 - 12.0 fL Final     Platelets   Date Value Ref Range Status   03/24/2024 161 140 - 450 10*3/mm3 Final     Neutrophil %   Date Value Ref Range Status   03/24/2024 71.3 42.7 - 76.0 % Final     Lymphocyte %   Date Value Ref Range Status   03/24/2024 15.8 (L) 19.6 - 45.3 % Final     Monocyte %   Date Value Ref Range Status    03/24/2024 9.1 5.0 - 12.0 % Final     Eosinophil %   Date Value Ref Range Status   03/24/2024 2.8 0.3 - 6.2 % Final     Basophil %   Date Value Ref Range Status   03/24/2024 0.3 0.0 - 1.5 % Final     Immature Grans %   Date Value Ref Range Status   03/24/2024 0.7 (H) 0.0 - 0.5 % Final     Neutrophils, Absolute   Date Value Ref Range Status   03/24/2024 12.42 (H) 1.70 - 7.00 10*3/mm3 Final     Lymphocytes, Absolute   Date Value Ref Range Status   03/24/2024 2.75 0.70 - 3.10 10*3/mm3 Final     Monocytes, Absolute   Date Value Ref Range Status   03/24/2024 1.59 (H) 0.10 - 0.90 10*3/mm3 Final     Eosinophils, Absolute   Date Value Ref Range Status   03/24/2024 0.49 (H) 0.00 - 0.40 10*3/mm3 Final     Basophils, Absolute   Date Value Ref Range Status   03/24/2024 0.05 0.00 - 0.20 10*3/mm3 Final     Immature Grans, Absolute   Date Value Ref Range Status   03/24/2024 0.12 (H) 0.00 - 0.05 10*3/mm3 Final     nRBC   Date Value Ref Range Status   03/24/2024 0.0 0.0 - 0.2 /100 WBC Final       Condition on Discharge:  Stable    Vital Signs  Temp:  [97.5 °F (36.4 °C)-98.2 °F (36.8 °C)] 97.5 °F (36.4 °C)  Heart Rate:  [] 108  Resp:  [16-18] 16  BP: (125-145)/(76-87) 129/84    Physical Exam:   See Progress Note    Discharge Disposition  Home or Self Care    Discharge Medications     Discharge Medications        New Medications        Instructions Start Date   cyclobenzaprine 5 MG tablet  Commonly known as: FLEXERIL   5 mg, Oral, 2 Times Daily PRN      docusate sodium 100 MG capsule   100 mg, Oral, 2 Times Daily      ibuprofen 600 MG tablet  Commonly known as: ADVIL,MOTRIN   600 mg, Oral, Every 6 Hours PRN             Changes to Medications        Instructions Start Date   acetaminophen 325 MG tablet  Commonly known as: TYLENOL  What changed: See the new instructions.   650 mg, Oral, Every 6 Hours PRN      albuterol sulfate  (90 Base) MCG/ACT inhaler  Commonly known as: PROVENTIL HFA;VENTOLIN HFA;PROAIR HFA  What  changed:   See the new instructions.  Another medication with the same name was removed. Continue taking this medication, and follow the directions you see here.   2 puffs, Inhalation, Every 4 Hours PRN      albuterol sulfate  (90 Base) MCG/ACT inhaler  Commonly known as: PROVENTIL HFA;VENTOLIN HFA;PROAIR HFA  What changed: You were already taking a medication with the same name, and this prescription was added. Make sure you understand how and when to take each.  Replaces: albuterol (2.5 MG/3ML) 0.083% nebulizer solution   2 puffs, Inhalation, Every 4 Hours PRN             Continue These Medications        Instructions Start Date   buprenorphine-naloxone 8-2 MG per SL tablet  Commonly known as: SUBOXONE   PLACE 2 TABLETS UNDER THE TONGUE EVERY DAY      ipratropium-albuterol 0.5-2.5 mg/3 ml nebulizer  Commonly known as: DUO-NEB   ipratropium-albuterol 0.5 mg-3 mg(2.5 mg base)/3 mL inhalation solution for nebulization inhale 3 milliliters by nebulization route 1 time per day for 30 days 5/24/2021  Active      naloxone 4 MG/0.1ML nasal spray  Commonly known as: NARCAN   CALL 911. SPR CONTENTS OF ONE SPRAYER (0.1ML) INTO ONE NOSTRIL. REPEAT IN 2-3 MIN IF SYMPTOMS OF OPIOID EMERGENCY PERSIST, ALTERNATE NOSTRILS      prenatal (CLASSIC) vitamin 28-0.8 MG tablet tablet  Generic drug: prenatal vitamin   Oral, Daily             Stop These Medications      albuterol (2.5 MG/3ML) 0.083% nebulizer solution  Commonly known as: PROVENTIL  Replaced by: albuterol sulfate  (90 Base) MCG/ACT inhaler  You also have another medication with the same name that you need to continue taking as instructed.     diphenhydrAMINE 25 mg capsule  Commonly known as: BENADRYL     ondansetron ODT 4 MG disintegrating tablet  Commonly known as: ZOFRAN-ODT              Discharge Diet: Regular    Activity at Discharge: Pelvic rest X6 weeks    Education: Warning signs and symptoms given, no tub baths, nothing in the vagina for 6 weeks, no  driving for 2 weeks or while talking narcotics     Follow-up Appointments  Future Appointments   Date Time Provider Department Center   3/29/2024  2:00 PM  [facility]   3/29/2024  2:30 PM Aurelio Jin MD [facility]     Additional Instructions for the Follow-ups that You Need to Schedule       Discharge Follow-up with Specified Provider: Davin; 1 Week   As directed      To: Davin   Follow Up: 1 Week   Follow Up Details: BP check                Test Results Pending at Discharge  Pending Labs       Order Current Status    Buprenorphine Screen Urine - Urine, Clean Catch In process    URINE DRUG SCREEN PLUS BUPRENORPHINE - Urine, Clean Catch In process             Sultana Dailey, APRN  03/25/24  09:07 EDT    Time: 09:07 EDT

## 2024-03-25 NOTE — OUTREACH NOTE
[facility]  IP Postpartum    Questions/Answers      Flowsheet Row Responses   Best Method for Contacting Cell   Support Person Present No   Does the patient have a car seat at the hospital Yes   Delivery Note Reviewed Reviewed   Were birth expectations met? Yes   Is there a need for additional support/resources? No   Lactation Note Reviewed Other   Note Reviewed Other Comment formula feeding   Is additional support needed? No   Any questions or concerns? No   Is the patient going to use Meds to Beds? Yes   Any concerns related discharge meds/ability to  prescriptions? No   Confirm Postpartum OB appointment --  [reviewed]   Confirm initial well-child Pediatrician appointment date/time: --  [reviewed]   Additional post-discharge F/U appointments No   Does patient have transportation to appointments? Yes   Any other assistance needed to ensure she is able to attend appointments? No   Does patient have supplies needed at home for  care? Clothing, Crib, Diapers          Pt reports that she is hoping to room in with infant after her discharge. She states that she feels guilty about infant's withdrawal from suboxone but reassured at length about her sobriety. We reviewed therapy options and she will think about referral to virtual therapy. She now has a car for transportation. We reviewed f/u appts and WIC for her county. She has all necessary infant supplies. F/u in one week.     Liz Correa RN  Maternity Nurse Navigator    3/25/2024, 10:16 EDT

## 2024-03-25 NOTE — PROGRESS NOTES
"Discharge Planning Assessment  [facility]     Patient Name: Rajwinder Pascual  MRN: [id number]  Today's Date: 3/25/2024    Admit Date: 3/22/2024    Plan: Infant may discharge to mother when medically ready; CSW will follow cord. EVY Treadwell.   Discharge Needs Assessment    No documentation.                  Discharge Plan       Row Name 03/25/24 1124       Plan    Plan Infant may discharge to mother when medically ready; CSW will follow cord. EVY Treadwell.    Plan Comments Mother: Rajwinder Pascual, MRN: [id number]; infant: Rajwinder Muñiz \"Ryalynn\" Ankur, MRN: [id number]. CSW consulted for \"Mother has substance abuse history. Taking suboxone.\" Of note, mother's UDS was negative on 10/9 & admit. Infant's UDS was missed; cord toxicology sent. CSW met with mother alone at bedside. Mother verified address, phone number, and insurance. Mother reports [employer] has spoken to her about adding infant to health insurance. Mother reports having a car seat, crib/bassinet, clothes, and diapers for infant. This is mother and father's first baby. Mother reports, maternal grandparents, paternal grandparents, maternal brothers, maternal sister-in-law, father of infant/SO, recovery support group, and other family members are available for support as needed. Mother reports infant is following up with Dr. Tejada after discharge; mother is comfortable scheduling appointments for infant and has reliable transportation. Mother is not current with WIC but plans to apply following discharge. Mother denies any violence, threats, or feeling unsafe at home or relationship. CSW inquired about mother's MAT program. Mother reports she is prescribed Suboxone from [facility]. Mother shared \"I love the program, and everything is going great. I feel like a completely new and better person now.\" CSW congratulated and praised mother on her recovery. CSW discussed infant's cord toxicology, as well as mandated reporting to CPS " if infant's cord toxicology is positive for Buprenorphine or other substances. Mother voiced understanding and asked appropriate follow up questions. CSW provided mother with a packet of resources including: WIC, HANDS, transportation, infant supplies, counseling, online support groups, postpartum mood and anxiety resources, and general community resources. CSW spent time building rapport with mother, and offered validation, support, and encouragement to mother throughout assessment. Mother was polite and appropriate, and denied having unmet needs or concerns at this time. CSW will follow cord toxicology and complete mandated reporting to CPS if warranted. EVY Treadwell.                  Continued Care and Services - Admitted Since 3/22/2024    No active coordination exists for this encounter.       Selected Continued Care - Episodes Includes continued care and service providers with selected services from the active episodes listed below      [facility] Episode start date: 12/26/2023   There are no active outsourced providers for this episode.                 Expected Discharge Date and Time       Expected Discharge Date Expected Discharge Time    Mar 25, 2024            Demographic Summary       Row Name 03/25/24 1123       General Information    Admission Type inpatient    Arrived From home    Referral Source nursing    Reason for Consult substance use concerns    General Information Comments Mother has substance abuse history. Taking suboxone.                   Functional Status       Row Name 03/25/24 1123       Functional Status, IADL    Medications independent    Meal Preparation independent    Housekeeping independent    Laundry independent    Shopping independent       Mental Status    General Appearance WDL WDL       Mental Status Summary    Recent Changes in Mental Status/Cognitive Functioning no changes                   Psychosocial       Row Name 03/25/24 1123       Behavior WDL    Behavior  WDL WDL       Emotion Mood WDL    Emotion/Mood/Affect WDL WDL       Speech WDL    Speech WDL WDL       Perceptual State WDL    Perceptual State WDL WDL       Thought Process WDL    Thought Process WDL WDL       Intellectual Performance WDL    Intellectual Performance WDL WDL       Coping/Stress    Major Change/Loss/Stressor birth    Patient Personal Strengths future/goal oriented;motivated;positive attitude;strong support system    Sources of Support other family members;parent(s);sibling(s);significant other;friend(s);community support                   Abuse/Neglect       Row Name 03/25/24 1124       Personal Safety    Feels Unsafe at Home or Work/School no    Feels Threatened by Someone no    Does Anyone Try to Keep You From Having Contact with Others or Doing Things Outside Your Home? no    Physical Signs of Abuse Present no                   Legal    No documentation.                  Substance Abuse       Row Name 03/25/24 1124       Substance Use    Substance Use Comment Mom UDS neg. No infant UDS; cord tox sent.                   Patient Forms    No documentation.                     DAYSI Woodard

## 2024-03-25 NOTE — PROGRESS NOTES
"Postpartum Progress Note      Status post Vaginal Delivery: Doing well postoperatively.     1) Postpartum care immediately following delivery :    Routine care. Discharge home today. Reviewed discharge instructions in detail. She is planning to board as infant is not able to be discharge at this time due to PING screening.     2) History of substance abuse: Symptoms well controlled on suboxone. This is prescribed by online service [facility].     3) Gestational Hypertension: No current medications. Labs negative for preeclampsia. Asymptomatic. BP normal to mild range. Reviewed preeclampsia precautions in detail. She will RTO in 1 week for BP check    4) C/o bilateral shoulder pain: Also describes as tension. Not improved with NSAID. Requests additional medication to treat. Will trial flexeril. She had full ROM bilateral arms and shoulders    5) Depression: Discussed increased [place] score. She reports to me \"that was how I was feeling during my pregnancy\" She also states this is improving since deliver. In the past she has treated with effexor, declines to restart at this time. She completes CBT through her recovery program. Advise to call with any worsening S&S. Go to ED with and SI or HI.     6) Wheeze: Inspiratory wheeze bilaterally. Improves with cough. She denies any chest pain or SOA. Has used inhaler in the past, but does not have currently. + tobacco use. Will send inhaler to pharmacy today.     Hgb 11.7-->10.9  Rh status: A+  Syphilis screen in hospital: non-reactive  Rubella: Immune  Varicella: Non-immune-vaccine ordered  Gender: Female    Subjective     Postpartum Day 2: Vaginal delivery    The patient feels well. The patient denies emotional concerns. Pain is well controlled with current medications. The baby is well. The patient is ambulating well. The patient is tolerating a normal diet.     Objective     Vital signs in last 24 hours:  Temp:  [97.5 °F (36.4 °C)-98.2 °F (36.8 °C)] 97.5 " °F (36.4 °C)  Heart Rate:  [] 108  Resp:  [16-18] 16  BP: (125-145)/(76-87) 129/84      General:    alert, appears stated age, and cooperative   CV: RRR, no m/r/g   Lungs: CTAB   Abdomen:  Soft, Non-tender    Lochia:  appropriate   Uterine Fundus:   firm   Ext    Edema trace   DVT Evaluation:  No evidence of DVT seen on physical exam.     Lab Results   Component Value Date    WBC 17.42 (H) 03/24/2024    HGB 10.9 (L) 03/24/2024    HCT 32.6 (L) 03/24/2024    MCV 96.4 03/24/2024     03/24/2024       Sultana Dailey, APRN  3/25/2024  08:49 EDT

## 2024-03-28 NOTE — PROGRESS NOTES
"Continued Stay Note  [facility]     Patient Name: Rajwinder Pascual  MRN: [id number]  Today's Date: 3/28/2024    Admit Date: 3/22/2024    Plan: Infant may discharge to mother when medically ready. EVY Treadwell.   Discharge Plan       Row Name 03/28/24 1345       Plan    Plan Infant may discharge to mother when medically ready. EVY Treadwell.    Plan Comments Mother: Rajwinder Pascual, MRN: [id number]; infant: Rajwinder Muñiz \"Ryalynn\" Ankur, MRN: [id number]. [facility] has reviewed infant's cord toxicology results and infant's cord was negative for substances. Mandated CPS reporting is not required at this time. EVY Treadwell.                   Discharge Codes    No documentation.                 Expected Discharge Date and Time       Expected Discharge Date Expected Discharge Time    Mar 25, 2024               DAYSI Woodard    "

## 2024-03-29 LAB — BUPRENORPHINE+NOR UR QL: POSITIVE

## 2024-04-02 ENCOUNTER — PATIENT OUTREACH (OUTPATIENT)
Dept: LABOR AND DELIVERY | Facility: HOSPITAL | Age: 24
End: 2024-04-02
Payer: COMMERCIAL

## 2024-04-02 NOTE — OUTREACH NOTE
[facility]  Unable to Reach       Questions/Answers      Flowsheet Row Responses   Pending Outreach Postpartum Check-in   Call Attempt First   Outcome Left message   Next Call Attempt Date 04/03/24            Liz Correa RN  Maternity Nurse Navigator    4/2/2024, 09:47 EDT

## 2024-04-03 ENCOUNTER — PATIENT OUTREACH (OUTPATIENT)
Dept: LABOR AND DELIVERY | Facility: HOSPITAL | Age: 24
End: 2024-04-03
Payer: COMMERCIAL

## 2024-04-03 NOTE — OUTREACH NOTE
[facility]  Unable to Reach       Questions/Answers      Flowsheet Row Responses   Pending Outreach Postpartum Check-in   Call Attempt Second   Outcome Left message   Next Call Attempt Date 04/04/24            Liz Correa RN  Maternity Nurse Navigator    4/3/2024, 16:47 EDT

## 2024-04-04 ENCOUNTER — PATIENT OUTREACH (OUTPATIENT)
Dept: LABOR AND DELIVERY | Facility: HOSPITAL | Age: 24
End: 2024-04-04
Payer: COMMERCIAL

## 2024-04-04 NOTE — OUTREACH NOTE
[facility]  Unable to Reach       Questions/Answers      Flowsheet Row Responses   Pending Outreach Postpartum Check-in   Call Attempt Third   Outcome Left message, MyChart message sent to patient          UTRx3 for PP check in, will send to RN call center.     Liz SANDERS - RN  Maternity Nurse Navigator    4/4/2024, 09:50 EDT

## 2024-04-11 ENCOUNTER — PATIENT OUTREACH (OUTPATIENT)
Dept: CALL CENTER | Facility: HOSPITAL | Age: 24
End: 2024-04-11
Payer: COMMERCIAL

## 2024-04-11 NOTE — OUTREACH NOTE
[facility] Survey      Flowsheet Row Responses   [facility]   [facility] attempt successful? No   Unsuccessful attempts Attempt 1   Reschedule Today              Cora SANDERS - Registered Nurse

## 2024-04-11 NOTE — OUTREACH NOTE
[facility] Survey      Flowsheet Row Responses   [facility] facility patient discharged from? [place]   Week 1 attempt successful? No   Unsuccessful attempts Attempt 2   Reschedule Tomorrow              Cora SANDERS - Registered Nurse

## 2024-04-12 ENCOUNTER — PATIENT OUTREACH (OUTPATIENT)
Dept: CALL CENTER | Facility: HOSPITAL | Age: 24
End: 2024-04-12
Payer: COMMERCIAL

## 2024-04-12 NOTE — OUTREACH NOTE
[facility] Survey      Flowsheet Row Responses   [facility] facility patient discharged from? [place]   Week 1 attempt successful? No   Unsuccessful attempts Attempt 3   Revoke Decline to participate              Daniel SALGUERO - Registered Nurse